# Patient Record
Sex: FEMALE | Race: BLACK OR AFRICAN AMERICAN | NOT HISPANIC OR LATINO | URBAN - METROPOLITAN AREA
[De-identification: names, ages, dates, MRNs, and addresses within clinical notes are randomized per-mention and may not be internally consistent; named-entity substitution may affect disease eponyms.]

---

## 2024-01-01 ENCOUNTER — EMERGENCY (EMERGENCY)
Facility: HOSPITAL | Age: 34
LOS: 1 days | End: 2024-01-01
Attending: STUDENT IN AN ORGANIZED HEALTH CARE EDUCATION/TRAINING PROGRAM
Payer: SELF-PAY

## 2024-01-01 VITALS — TEMPERATURE: 96 F

## 2024-01-01 PROCEDURE — 99285 EMERGENCY DEPT VISIT HI MDM: CPT

## 2024-01-01 PROCEDURE — 82962 GLUCOSE BLOOD TEST: CPT

## 2024-01-01 PROCEDURE — 99282 EMERGENCY DEPT VISIT SF MDM: CPT

## 2024-10-11 NOTE — ED ADULT NURSE NOTE - NSFALLHARMRISKINTERV_ED_ALL_ED

## 2024-10-11 NOTE — ED PROVIDER NOTE - PHYSICAL EXAMINATION
General: A&Ox0, GCS 3, unresponsive to noxious stimuli   Cardiac: No auscultative heart beat  Respiratory: Breath sounds bilaterally, no spontaneous respirations  Abd: Soft, no palpable masses   Vascular: No pulse  Neuro: GCS 3  HEENT: Pupils fixed and dilated  Skin: Cold. LUE AV fistula noted. R groin with large ulcer with small amount of drainage

## 2024-10-11 NOTE — ED PROVIDER NOTE - CLINICAL SUMMARY MEDICAL DECISION MAKING FREE TEXT BOX
Patient is a 34-year-old female with past medical history ESRD on hemodialysis brought in by EMS in cardiac arrest.  Patient last well-known approximately 3 AM.  Patient comes in [place] found her  pulseless and unresponsive at approximately  6:15 AM.  Patient was given 4 epinephrines prior to arrival and went into  a shockable rhythm at which point she received defibrillation.  Patient arrives in [facility],   Found to have V-fib 1 time requiring 200 J of defibrillation.  Intubated for airway protection. Patient subsequently returned into [facility]. over 15 rounds of epinephrine,  bicarbonate, multiple rounds of calcium, multiple rounds of magnesium, 450 mg amiodarone given.  Patient subsequently went into asystole.   Found to be hypoglycemic, multiple rounds of dextrose, intramuscular glucagon given.  Fingerstick returned to the 300s.   Patient persistently in asystole.  Bedside ultrasound  showing cardiac standstill.  No obvious signs of trauma. Large R groin ulcer noted. Pupils fixed dilated unresponsive to light.  Narcan given one-time.  Time of death called at 740, no signs of life. Patient is a 34-year-old female with past medical history ESRD on hemodialysis brought in by EMS in cardiac arrest.  Patient last well-known approximately 3 AM.  Patient cousin El found her  pulseless and unresponsive at approximately  6:15 AM.  Patient was given 4 epinephrines prior to arrival and went into  a shockable rhythm at which point she received defibrillation.  Patient arrives in [facility],   Found to have V-fib 1 time requiring 200 J of defibrillation.  Intubated for airway protection. Patient subsequently returned into PEA. over 15 rounds of epinephrine,  bicarbonate, multiple rounds of calcium, multiple rounds of magnesium, 450 mg amiodarone given.  Patient subsequently went into asystole.   Found to be hypoglycemic, multiple rounds of dextrose, intramuscular glucagon given.  Fingerstick returned to the 300s.   Patient persistently in asystole.  Bedside ultrasound  showing cardiac standstill.  No obvious signs of trauma. Large R groin ulcer noted. Pupils fixed dilated unresponsive to light.  Narcan given one-time.  Time of death called at 740, no signs of life.

## 2024-10-11 NOTE — ED ADULT TRIAGE NOTE - OTHER COMPLAINTS
BGL - on scene - 40 . 5 EPI was given prior arrival , CA and DEXTROSE 50 % IV given prior arrival by EMS

## 2024-10-11 NOTE — ED ADULT TRIAGE NOTE - CHIEF COMPLAINT QUOTE
brought as notification - CARDIA C ARREST  from home - was found unresponsive at 6 am .. PT INTUBATED ON ARRIVAL , CPR in PROGRESS

## 2024-10-11 NOTE — ED PROVIDER NOTE - OBJECTIVE STATEMENT
Patient is a 34-year-old female with past medical history ESRD on hemodialysis brought in by EMS in cardiac arrest.  Patient last well-known approximately 3 AM.  Patient comes in [place] found her  pulseless and unresponsive at approximately  6:15 AM.  Patient was given 4 epinephrines prior to arrival and went into  a shockable rhythm at which point she received defibrillation.  Patient arrives in [facility],   Found to have V-fib 1 time requiring 200 J of defibrillation.   Patient subsequently returned into PEA. over 15 rounds of epinephrine,  bicarbonate, multiple rounds of calcium, multiple rounds of magnesium, 450 mg amiodarone given.  Patient subsequently went into asystole.   Found to be hypoglycemic, multiple rounds of dextrose, intramuscular glucagon given.  Fingerstick returned to the 300s.   Patient persistently in asystole.  Bedside ultrasound  showing cardiac standstill.  No obvious signs of trauma.  Pupils fixed dilated unresponsive to light.  Narcan given one-time.  Time of death called at 1040, no signs of life. Patient is a 34-year-old female with past medical history ESRD on hemodialysis brought in by EMS in cardiac arrest.  Patient last well-known approximately 3 AM.  Patient comes in [facility] ([phone])  found her  pulseless and unresponsive at approximately  6:15 AM.  Patient was given 4 epinephrines prior to arrival and went into  a shockable rhythm at which point she received defibrillation.  Patient arrives in [facility],   Found to have V-fib 1 time requiring 200 J of defibrillation.   Patient subsequently returned into [facility]. over 15 rounds of epinephrine,  bicarbonate, multiple rounds of calcium, multiple rounds of magnesium, 450 mg amiodarone given.  Patient subsequently went into asystole.   Found to be hypoglycemic, multiple rounds of dextrose, intramuscular glucagon given.  Fingerstick returned to the 300s.   Patient persistently in asystole.  Bedside ultrasound  showing cardiac standstill.  No obvious signs of trauma.  Pupils fixed dilated unresponsive to light.  Narcan given one-time.  Time of death called at 1040, no signs of life. Patient is a 34-year-old female with past medical history ESRD on hemodialysis brought in by EMS in cardiac arrest.  Patient last well-known approximately 3 AM.  Patient cousin Mony Jacinto ([phone])  found her  pulseless and unresponsive at approximately 6:15 AM.  Patient was given 4 epinephrines prior to arrival and went into  a shockable rhythm at which point she received defibrillation.  Patient arrives in [facility],   Found to have V-fib 1 time requiring 200 J of defibrillation.   Patient subsequently returned into [facility]. over 15 rounds of epinephrine,  bicarbonate, multiple rounds of calcium, multiple rounds of magnesium, 450 mg amiodarone given.  Patient subsequently went into asystole.   Found to be hypoglycemic, multiple rounds of dextrose, intramuscular glucagon given.  Fingerstick returned to the 300s.   Patient persistently in asystole.  Bedside ultrasound  showing cardiac standstill.  No obvious signs of trauma.  Pupils fixed dilated unresponsive to light.  Narcan given one-time.  Time of death called at 1040, no signs of life. Patient is a 34-year-old female with past medical history ESRD on hemodialysis brought in by EMS in cardiac arrest.  Patient last well-known approximately 3 AM.  Patient cousin Mony Jacinto ([phone])  found her  pulseless and unresponsive at approximately 6:15 AM.  Patient was given 4 epinephrines prior to arrival and went into  a shockable rhythm at which point she received defibrillation.  Patient arrives in [facility],   Found to have V-fib 1 time requiring 200 J of defibrillation.   Patient subsequently returned into [facility]. over 15 rounds of epinephrine,  bicarbonate, multiple rounds of calcium, multiple rounds of magnesium, 450 mg amiodarone given.  Patient subsequently went into asystole.   Found to be hypoglycemic, multiple rounds of dextrose, intramuscular glucagon given.  Fingerstick returned to the 300s.   Patient persistently in asystole.  Bedside ultrasound  showing cardiac standstill.  No obvious signs of trauma.  Pupils fixed dilated unresponsive to light.  Narcan given one-time.  Time of death called at 740, no signs of life.

## 2024-10-11 NOTE — ED ADULT NURSE NOTE - OBJECTIVE STATEMENT
The patient  is a 34 y Female BIBA as Notification  , CARDIAC ARREST . CPR continued in the ED ET tube 7.5 cm inserted by  . I.O inserted in Left leg and Right leg and meds injected via I/O . ACLS protocols Followed  Failed . Pt certified  at 7.40 am  by  The patient  is a 34 y Female BIBA as Notification  , CARDIAC ARREST . CPR continued in the ED. ET tube 7.5 cm inserted by  . I.O inserted in Left leg and Right leg and meds injected via I/O . ACLS protocols Followed  but  Failed . Pt certified  at 7.40 am  by  . Organ Donor called .conformation # [id number] .BERT is the person took organ donor information